# Patient Record
Sex: FEMALE | Race: WHITE | NOT HISPANIC OR LATINO | Employment: OTHER | URBAN - METROPOLITAN AREA
[De-identification: names, ages, dates, MRNs, and addresses within clinical notes are randomized per-mention and may not be internally consistent; named-entity substitution may affect disease eponyms.]

---

## 2023-08-23 ENCOUNTER — LAB (OUTPATIENT)
Dept: LAB | Facility: LAB | Age: 48
End: 2023-08-23
Payer: MEDICARE

## 2023-08-23 LAB
ALBUMIN (G/DL) IN SER/PLAS: 3.7 G/DL (ref 3.4–5)
ANION GAP IN SER/PLAS: 19 MMOL/L (ref 10–20)
CALCIUM (MG/DL) IN SER/PLAS: 9.9 MG/DL (ref 8.6–10.6)
CARBON DIOXIDE, TOTAL (MMOL/L) IN SER/PLAS: 25 MMOL/L (ref 21–32)
CHLORIDE (MMOL/L) IN SER/PLAS: 95 MMOL/L (ref 98–107)
CREATININE (MG/DL) IN SER/PLAS: 6.77 MG/DL (ref 0.5–1.05)
ERYTHROCYTE DISTRIBUTION WIDTH (RATIO) BY AUTOMATED COUNT: 16 % (ref 11.5–14.5)
ERYTHROCYTE MEAN CORPUSCULAR HEMOGLOBIN CONCENTRATION (G/DL) BY AUTOMATED: 30.2 G/DL (ref 32–36)
ERYTHROCYTE MEAN CORPUSCULAR VOLUME (FL) BY AUTOMATED COUNT: 101 FL (ref 80–100)
ERYTHROCYTES (10*6/UL) IN BLOOD BY AUTOMATED COUNT: 3.41 X10E12/L (ref 4–5.2)
GFR FEMALE: 7 ML/MIN/1.73M2
GLUCOSE (MG/DL) IN SER/PLAS: 253 MG/DL (ref 74–99)
HEMATOCRIT (%) IN BLOOD BY AUTOMATED COUNT: 34.4 % (ref 36–46)
HEMOGLOBIN (G/DL) IN BLOOD: 10.4 G/DL (ref 12–16)
INR IN PPP BY COAGULATION ASSAY: 1.8 (ref 0.9–1.1)
LEUKOCYTES (10*3/UL) IN BLOOD BY AUTOMATED COUNT: 13.7 X10E9/L (ref 4.4–11.3)
NRBC (PER 100 WBCS) BY AUTOMATED COUNT: 0 /100 WBC (ref 0–0)
PHOSPHATE (MG/DL) IN SER/PLAS: 5.1 MG/DL (ref 2.5–4.9)
PLATELETS (10*3/UL) IN BLOOD AUTOMATED COUNT: 272 X10E9/L (ref 150–450)
POTASSIUM (MMOL/L) IN SER/PLAS: 4.1 MMOL/L (ref 3.5–5.3)
PROTHROMBIN TIME (PT) IN PPP BY COAGULATION ASSAY: 20.4 SEC (ref 9.8–12.8)
SODIUM (MMOL/L) IN SER/PLAS: 135 MMOL/L (ref 136–145)
UREA NITROGEN (MG/DL) IN SER/PLAS: 70 MG/DL (ref 6–23)

## 2023-10-09 PROBLEM — B35.1 DERMATOPHYTOSIS OF NAIL: Status: ACTIVE | Noted: 2023-05-26

## 2023-10-09 PROBLEM — Z95.5 HISTORY OF CORONARY ARTERY STENT PLACEMENT: Status: ACTIVE | Noted: 2023-10-09

## 2023-10-09 PROBLEM — R60.9 FLUID RETENTION: Status: ACTIVE | Noted: 2023-10-09

## 2023-10-09 PROBLEM — G90.9 AUTONOMIC DYSFUNCTION: Status: ACTIVE | Noted: 2023-10-09

## 2023-10-09 PROBLEM — S98.132A AMPUTATION OF TOE OF LEFT FOOT (CMS-HCC): Status: ACTIVE | Noted: 2023-05-26

## 2023-10-09 PROBLEM — T81.30XA WOUND DEHISCENCE: Status: ACTIVE | Noted: 2023-05-26

## 2023-10-09 PROBLEM — I10 HYPERTENSION: Status: ACTIVE | Noted: 2023-10-09

## 2023-10-09 PROBLEM — I73.9 PERIPHERAL ARTERIAL DISEASE (CMS-HCC): Status: ACTIVE | Noted: 2023-05-26

## 2023-10-09 PROBLEM — T45.515A WARFARIN-INDUCED COAGULOPATHY (MULTI): Status: ACTIVE | Noted: 2023-10-09

## 2023-10-09 PROBLEM — I10 BENIGN ESSENTIAL HTN: Status: ACTIVE | Noted: 2023-10-09

## 2023-10-09 PROBLEM — S90.112A CONTUSION OF LEFT GREAT TOE WITHOUT DAMAGE TO NAIL: Status: ACTIVE | Noted: 2023-05-26

## 2023-10-09 PROBLEM — I63.9 STROKE (MULTI): Status: ACTIVE | Noted: 2023-10-09

## 2023-10-09 PROBLEM — L97.524: Status: ACTIVE | Noted: 2023-05-26

## 2023-10-09 PROBLEM — E78.5 HYPERLIPIDEMIA: Status: ACTIVE | Noted: 2023-10-09

## 2023-10-09 PROBLEM — F32.A DEPRESSION: Status: ACTIVE | Noted: 2023-10-09

## 2023-10-09 PROBLEM — Z99.2 ESRD (END STAGE RENAL DISEASE) ON DIALYSIS (MULTI): Status: ACTIVE | Noted: 2023-05-26

## 2023-10-09 PROBLEM — E11.42 TYPE 2 DIABETES MELLITUS WITH PERIPHERAL NEUROPATHY (MULTI): Status: ACTIVE | Noted: 2023-05-26

## 2023-10-09 PROBLEM — D68.32 WARFARIN-INDUCED COAGULOPATHY (MULTI): Status: ACTIVE | Noted: 2023-10-09

## 2023-10-09 PROBLEM — N18.6 ESRD (END STAGE RENAL DISEASE) ON DIALYSIS (MULTI): Status: ACTIVE | Noted: 2023-05-26

## 2023-10-09 PROBLEM — Z86.73 HISTORY OF STROKE: Status: ACTIVE | Noted: 2023-05-26

## 2023-10-09 RX ORDER — UBIDECARENONE 75 MG
500 CAPSULE ORAL EVERY MORNING
COMMUNITY
End: 2023-10-16 | Stop reason: SDUPTHER

## 2023-10-09 RX ORDER — ACETAMINOPHEN 500 MG
1 TABLET ORAL NIGHTLY
COMMUNITY
Start: 2018-10-30

## 2023-10-09 RX ORDER — CALCIUM ACETATE 667 MG/1
3 TABLET ORAL 3 TIMES DAILY
COMMUNITY

## 2023-10-09 RX ORDER — INSULIN GLARGINE 300 U/ML
INJECTION, SOLUTION SUBCUTANEOUS NIGHTLY
COMMUNITY
End: 2023-10-16 | Stop reason: SDUPTHER

## 2023-10-09 RX ORDER — INSULIN DEGLUDEC 100 U/ML
INJECTION, SOLUTION SUBCUTANEOUS NIGHTLY
COMMUNITY

## 2023-10-09 RX ORDER — OMEPRAZOLE 20 MG/1
CAPSULE, DELAYED RELEASE ORAL
COMMUNITY
Start: 2023-03-08 | End: 2023-10-16 | Stop reason: ALTCHOICE

## 2023-10-09 RX ORDER — ATORVASTATIN CALCIUM 80 MG/1
80 TABLET, FILM COATED ORAL EVERY MORNING
COMMUNITY
End: 2023-10-10 | Stop reason: SDUPTHER

## 2023-10-09 RX ORDER — INSULIN ASPART 100 [IU]/ML
INJECTION, SOLUTION INTRAVENOUS; SUBCUTANEOUS
COMMUNITY
End: 2023-10-16 | Stop reason: SDUPTHER

## 2023-10-09 RX ORDER — AMLODIPINE BESYLATE 10 MG/1
10 TABLET ORAL 2 TIMES DAILY
COMMUNITY
Start: 2023-05-30 | End: 2023-10-16 | Stop reason: ALTCHOICE

## 2023-10-09 RX ORDER — DOXYCYCLINE HYCLATE 100 MG
100 TABLET ORAL 2 TIMES DAILY
COMMUNITY

## 2023-10-09 RX ORDER — HYDRALAZINE HYDROCHLORIDE 10 MG/1
TABLET, FILM COATED ORAL
COMMUNITY
End: 2023-10-16 | Stop reason: SDUPTHER

## 2023-10-09 RX ORDER — ATORVASTATIN CALCIUM 40 MG/1
40 TABLET, FILM COATED ORAL
COMMUNITY
End: 2023-10-16 | Stop reason: SDUPTHER

## 2023-10-09 RX ORDER — PEN NEEDLE, DIABETIC 32GX 5/32"
NEEDLE, DISPOSABLE MISCELLANEOUS 4 TIMES DAILY
COMMUNITY
Start: 2023-08-15

## 2023-10-09 RX ORDER — COLLAGENASE SANTYL 250 [ARB'U]/G
OINTMENT TOPICAL DAILY
COMMUNITY
Start: 2023-08-10

## 2023-10-09 RX ORDER — ASPIRIN 81 MG/1
81 TABLET ORAL DAILY
COMMUNITY

## 2023-10-09 RX ORDER — DOCUSATE SODIUM 100 MG/1
100 CAPSULE, LIQUID FILLED ORAL 2 TIMES DAILY
COMMUNITY
Start: 2015-12-28

## 2023-10-09 RX ORDER — LOSARTAN POTASSIUM 50 MG/1
50 TABLET ORAL EVERY MORNING
COMMUNITY
End: 2023-10-16 | Stop reason: SDUPTHER

## 2023-10-09 RX ORDER — INSULIN GLARGINE 300 U/ML
12 INJECTION, SOLUTION SUBCUTANEOUS EVERY MORNING
COMMUNITY
Start: 2022-09-27 | End: 2023-10-16 | Stop reason: SDUPTHER

## 2023-10-09 RX ORDER — INSULIN ASPART 100 [IU]/ML
INJECTION, SOLUTION INTRAVENOUS; SUBCUTANEOUS
COMMUNITY
Start: 2015-11-25 | End: 2023-10-16 | Stop reason: SDUPTHER

## 2023-10-09 RX ORDER — CARVEDILOL 25 MG/1
1 TABLET ORAL
COMMUNITY
Start: 2016-08-09 | End: 2023-11-09

## 2023-10-09 RX ORDER — LANOLIN ALCOHOL/MO/W.PET/CERES
1 CREAM (GRAM) TOPICAL DAILY
COMMUNITY
Start: 2018-10-30

## 2023-10-09 RX ORDER — AMLODIPINE BESYLATE 5 MG/1
1 TABLET ORAL 2 TIMES DAILY
COMMUNITY
Start: 2018-03-19 | End: 2023-10-16 | Stop reason: ALTCHOICE

## 2023-10-09 RX ORDER — INSULIN ASPART 100 [IU]/ML
INJECTION, SUSPENSION SUBCUTANEOUS
COMMUNITY

## 2023-10-09 RX ORDER — LOSARTAN POTASSIUM 100 MG/1
100 TABLET ORAL DAILY
COMMUNITY

## 2023-10-09 RX ORDER — ACETAMINOPHEN 325 MG/1
2 TABLET ORAL EVERY 6 HOURS PRN
COMMUNITY
Start: 2021-11-02

## 2023-10-09 RX ORDER — ASPIRIN 325 MG
325 TABLET ORAL
COMMUNITY
Start: 2016-07-25 | End: 2023-10-16 | Stop reason: ALTCHOICE

## 2023-10-09 RX ORDER — WARFARIN SODIUM 5 MG/1
TABLET ORAL
COMMUNITY
Start: 2018-10-30

## 2023-10-09 RX ORDER — ACETAMINOPHEN, DIPHENHYDRAMINE HCL, PHENYLEPHRINE HCL 325; 25; 5 MG/1; MG/1; MG/1
TABLET ORAL
COMMUNITY
End: 2023-10-16 | Stop reason: SDUPTHER

## 2023-10-09 RX ORDER — AMLODIPINE BESYLATE 2.5 MG/1
2.5 TABLET ORAL 2 TIMES DAILY
COMMUNITY
End: 2023-10-16 | Stop reason: ALTCHOICE

## 2023-10-09 RX ORDER — FUROSEMIDE 20 MG/1
5 TABLET ORAL 2 TIMES DAILY
COMMUNITY

## 2023-10-09 RX ORDER — HYDRALAZINE HYDROCHLORIDE 25 MG/1
25 TABLET, FILM COATED ORAL 2 TIMES DAILY
COMMUNITY
Start: 2023-05-06

## 2023-10-09 RX ORDER — MINOXIDIL 2.5 MG/1
1 TABLET ORAL DAILY
COMMUNITY

## 2023-10-10 ENCOUNTER — OFFICE VISIT (OUTPATIENT)
Dept: CARDIOLOGY | Facility: CLINIC | Age: 48
End: 2023-10-10
Payer: MEDICARE

## 2023-10-10 VITALS
HEIGHT: 61 IN | DIASTOLIC BLOOD PRESSURE: 68 MMHG | BODY MASS INDEX: 22.66 KG/M2 | WEIGHT: 120 LBS | SYSTOLIC BLOOD PRESSURE: 122 MMHG | OXYGEN SATURATION: 98 % | HEART RATE: 67 BPM

## 2023-10-10 DIAGNOSIS — Z95.5 HISTORY OF CORONARY ARTERY STENT PLACEMENT: Primary | ICD-10-CM

## 2023-10-10 DIAGNOSIS — I25.10 CORONARY ARTERY DISEASE INVOLVING NATIVE CORONARY ARTERY OF NATIVE HEART WITHOUT ANGINA PECTORIS: Primary | ICD-10-CM

## 2023-10-10 DIAGNOSIS — N18.6 ESRD (END STAGE RENAL DISEASE) (MULTI): ICD-10-CM

## 2023-10-10 DIAGNOSIS — I25.810 CORONARY ARTERY DISEASE INVOLVING CORONARY BYPASS GRAFT OF NATIVE HEART WITHOUT ANGINA PECTORIS: ICD-10-CM

## 2023-10-10 PROCEDURE — 4010F ACE/ARB THERAPY RXD/TAKEN: CPT | Performed by: STUDENT IN AN ORGANIZED HEALTH CARE EDUCATION/TRAINING PROGRAM

## 2023-10-10 PROCEDURE — 3052F HG A1C>EQUAL 8.0%<EQUAL 9.0%: CPT | Performed by: STUDENT IN AN ORGANIZED HEALTH CARE EDUCATION/TRAINING PROGRAM

## 2023-10-10 PROCEDURE — 99214 OFFICE O/P EST MOD 30 MIN: CPT | Performed by: STUDENT IN AN ORGANIZED HEALTH CARE EDUCATION/TRAINING PROGRAM

## 2023-10-10 PROCEDURE — 3078F DIAST BP <80 MM HG: CPT | Performed by: STUDENT IN AN ORGANIZED HEALTH CARE EDUCATION/TRAINING PROGRAM

## 2023-10-10 PROCEDURE — 3074F SYST BP LT 130 MM HG: CPT | Performed by: STUDENT IN AN ORGANIZED HEALTH CARE EDUCATION/TRAINING PROGRAM

## 2023-10-10 PROCEDURE — 3066F NEPHROPATHY DOC TX: CPT | Performed by: STUDENT IN AN ORGANIZED HEALTH CARE EDUCATION/TRAINING PROGRAM

## 2023-10-10 NOTE — PROGRESS NOTES
Referring clinician: No ref. provider found   Date of Visit: 10/10/2023  9:20 AM EDT  Location of visit: [facility] [address]   Type of Visit: Established patient        Chief Complaint  Cardiac risk stratification pre kidney transplantation        Adult Risk ScreeningThere are no spiritual/cultural practices/values/needs that are important to know   Initial Fall Risk Screening:   VISHNU has not fallen in the last 6 months.       Living Will.   Living Will: No living will on file.   Healthcare POA: No healthcare proxy on file.    Tobacco Screening: Has not used tobacco in the past 6 months. Do you feel UNSAFE?   The patient feels safe in the home.     HPI:    48 year old medically disabled  known to have Type 1 DM, ex smoking history stopped 4/2015, multiple CVAs- l maintained on warfarin, HTN, HL,s/p COVID infection 12/2022, CAD s/p JEANCARLOS to the pLAD 7/13/2015 treated with Ticagrelor given Clopidogrel allergy. With LVEF drop to 45% in 2018 she underwent repeat LHC 10/2018 showed mild to moderate non-obstructive CAD which did not require intervention.she has ESRD on HD at [facility] with MWF schedule. Ms. Gomez has had cardiovascular testing. She had an echocardiogram done at [facility] 9/2021. Images are not available for LVEF is measured at 60-65%, normal right ventricular systolic function with trivial valvular disease and trivial pericardial effusion. She has also had a nuclear pharmacological stress test 9/2021 which details mild to moderate ischemia in LAD/LCx territory but on the left heart catheterization 11/2021 which demonstrated trivial nonobstructive coronary artery disease only.  She has completed cardiac MRI (stress protocol) 8/2022 which demonstrates preserved biventricular function with LVEF 63%. There was subendocardial ischemia in the mid/apical lateral wall.  On TTE 8/2022, LVEF 65-70% with no RWMA and RVSP 32 mmHg.    She underwent cardiovascular testing in  2023 which demonstrated abnormal ECG which prompted a left heart cath which demonstrated multivessel coronary artery disease.  She is status post CABG (8/28/2023 with Dr. Mohan (n situ SETH-LAD  and SVG Y of SETH to Cx ).  In the postoperative.  She required thoracocentesis.    She tells me she has been doing well since hospital discharge and is less fatigued with exertion.  Moreover, she denies chest pain, dyspnea at rest, exertional dyspnea, palpitations, orthopnea, PND, syncope, or presyncope.    She continues to ambulate with a rolling walker and believes she is unlimited with exertion.    She has been tolerating dialysis well, no adverse events.    She continues to have preserved biventricular function on TTE 8/2023.    Family History:  Mother - malignancy  CAD- paternal grandmother  Has 2 healthy adult children     SHX  Ex smoker  Never heavy ETOH drinker and nil since 2014  Never illicit drug use       Ix:  The electronic medical record has been reviewed by me for salient history. All cardiovascular imaging and testing available in the electronic medical record, and [employer] has been reviewed.    ROS: Full 10 pt review of symptoms of negative unless discussed above.     Problems:   Patient Active Problem List   Diagnosis    Amputation of toe of left foot (CMS/HCC)    Arteriosclerosis of coronary artery    Autonomic dysfunction    Bilateral edema of lower extremity    Benign essential HTN    Chronic ulcer of left foot with necrosis of bone (CMS/HCC)    Contusion of left great toe without damage to nail    Depression    Dermatophytosis of nail    Dyspnea on exertion    ESRD (end stage renal disease) on dialysis (CMS/HCC)    History of coronary artery stent placement    History of stroke    Hyperlipidemia    Hypertension    Nephrotic syndrome    Peripheral arterial disease (CMS/HCC)    Renal failure    Stroke (CMS/HCC)    Type 2 diabetes mellitus with peripheral neuropathy (CMS/HCC)    Warfarin-induced  "coagulopathy (CMS/HCC)    Wound dehiscence    Fluid retention     Medical History:   Past Medical History:   Diagnosis Date    Essential (primary) hypertension 09/29/2022    HTN (hypertension), benign    Personal history of other diseases of the circulatory system 09/29/2022    History of coronary artery disease    Personal history of other diseases of the nervous system and sense organs 09/29/2022    History of retinal hemorrhage    Personal history of other endocrine, nutritional and metabolic disease 09/29/2022    History of diabetes mellitus    Personal history of transient ischemic attack (TIA), and cerebral infarction without residual deficits 09/29/2022    History of stroke    Type 2 diabetes mellitus with diabetic autonomic (poly)neuropathy (CMS/HCC) 09/29/2022    Diabetic autonomic neuropathy     Surgical Hx:   Past Surgical History:   Procedure Laterality Date    CHOLECYSTECTOMY  07/30/2017    Cholecystectomy    CT ANGIO CORONARY ART WITH HEARTFLOW IF SCORE >30%  8/5/2021    CT HEART CORONARY ANGIOGRAM 8/5/2021 [facility]CILLARY LE[facility]    HYSTERECTOMY  09/29/2022    Hysterectomy    OTHER SURGICAL HISTORY  09/29/2022    Creation Of A-V Graft Fistula For Dialysis      Social Hx:   Tobacco Use: Medium Risk (10/10/2023)    Patient History     Smoking Tobacco Use: Former     Smokeless Tobacco Use: Unknown     Passive Exposure: Not on file     Alcohol Use: Not on file     Family Hx:   Family History   Problem Relation Name Age of Onset    Cancer Mother      Diabetes Mother's Brother      Cancer Maternal Grandmother        Exam:   Vitals:   Vitals:    10/10/23 0900   BP: 122/68   Pulse: 67   SpO2: 98%   Weight: 54.4 kg (120 lb)   Height: 1.549 m (5' 1\")     Wt Readings from Last 5 Encounters:   10/10/23 54.4 kg (120 lb)   02/01/23 55.3 kg (122 lb)   09/27/22 54.4 kg (120 lb)   08/04/22 54.9 kg (121 lb)   08/05/21 55.4 kg (122 lb 3.2 oz)     GEN:  very pleasant, well-appearing, no acute distress.  HEENT: JVP not " elevated, no icterus. No HJR  CHEST: Clear to auscultation, No wheeze, good AE bilat  CV: Regular rate, normal rhythm,. PMI not displaced  ABD: , Flat, SNT  EXT: Warm, well perfused, No LE edema bilaterally  NEURO: Pleasant, AO x 4     Labs:   Lab Results   Component Value Date    WBC 11.7 (H) 09/02/2023    WBC 14.3 (H) 09/01/2023    HGB 8.1 (L) 09/02/2023    HGB 8.2 (L) 09/01/2023    HCT 26.4 (L) 09/02/2023    HCT 26.2 (L) 09/01/2023     09/02/2023     09/01/2023     (H) 09/02/2023    MCV 99 09/01/2023     09/02/2023     (L) 09/01/2023    K 4.1 09/02/2023    K 4.3 09/01/2023    CL 97 (L) 09/02/2023    CL 97 (L) 09/01/2023    BUN 18 09/02/2023    BUN 41 (H) 09/01/2023    CREATININE 3.41 (H) 09/02/2023    CREATININE 5.24 (H) 09/01/2023    HGBA1C 8.5 (A) 08/24/2023    HGBA1C 9.0 (A) 08/04/2022       Notable Studies: imaging personally reviewed and summarized by me below  EKG:  No results found for this or any previous visit (from the past 4464 hour(s)).    Echo:  -8/2023 reviewed    Current Outpatient Medications   Medication Instructions    acetaminophen (Tylenol) 325 mg tablet 2 tablets, oral, Every 6 hours PRN    amLODIPine (Norvasc) 5 mg tablet 1 tablet, oral, 2 times daily    amLODIPine (NORVASC) 2.5 mg, oral, 2 times daily, Morning and bedtime<BR>    amLODIPine (NORVASC) 10 mg, oral, 2 times daily    aspirin 325 mg, oral, Daily RT    aspirin 81 mg, oral, Daily    atorvastatin (LIPITOR) 40 mg, oral, Daily RT    atorvastatin (LIPITOR) 80 mg, oral, Every morning    calcium acetate (Phoslo) 667 mg tablet 3 tablets, oral, 3 times daily    carvedilol (Coreg) 25 mg tablet 1 tablet, oral, 2 times daily with meals    cyanocobalamin (Vitamin B-12) 1,000 mcg tablet 1 tablet, oral, Daily    cyanocobalamin (VITAMIN B-12) 500 mcg, oral, Every morning    docusate sodium (COLACE) 100 mg, oral, 2 times daily    doxycycline (VIBRA-TABS) 100 mg, oral, 2 times daily    Droplet Pen Needle 32 gauge x  "5/32\" needle subcutaneous, 4 times daily    furosemide (Lasix) 20 mg tablet 5 tablets, oral, 2 times daily    hydrALAZINE (Apresoline) 10 mg tablet oral, Take by mouth. Only taking on days when she does not have dialysis<BR>    hydrALAZINE (APRESOLINE) 25 mg, oral, 2 times daily, Morning and bedtime<BR>    insulin asp prt-insulin aspart (NovoLOG Mix 70-30) 100 unit/mL (70-30) injection subcutaneous, 2 times daily with meals    insulin aspart (NovoLOG) 100 unit/mL injection Sliding scale while eating<BR>    insulin degludec (Tresiba) 100 unit/mL injection subcutaneous, Nightly    insulin detemir (LEVEMIR FLEXTOUCH) 12 Units, subcutaneous, At noon and 16 units at midnight    insulin glargine (Toujeo Max U-300 SoloStar) 300 unit/mL (3 mL) injection subcutaneous, Nightly    losartan (COZAAR) 50 mg, oral, Every morning    losartan (COZAAR) 100 mg, oral, Daily    melatonin 10 mg tablet oral    melatonin 5 mg tablet 1 tablet, oral, Nightly    minoxidil (Loniten) 2.5 mg tablet 1 tablet, oral, Daily    multivit-minerals/folic acid (CENTRUM ADULTS ORAL) Centrum    multivitamin/iron/folic acid (CENTRUM ORAL) 1 tablet, oral, Daily    NovoLOG FlexPen U-100 Insulin 100 unit/mL (3 mL) pen 1:15 CARB RATIO before meals three times a day PLUS ISS 1:50 STA... (REFER TO PRESCRIPTION NOTES).<BR>    omeprazole (PriLOSEC) 20 mg DR capsule     SantyL 250 unit/gram ointment Topical, Daily, Apply to right hallux diabetic ulcer once daily<BR>    SODIUM BICARBONATE ORAL 1 tablet, oral, 2 times daily    ticagrelor (BRILINTA) 60 mg, oral, 2 times daily    Toujeo SoloStar U-300 Insulin 12 Units, Every morning    warfarin (Coumadin) 5 mg tablet oral, As directed     Problems:   # Cardiac risk start pre kidney transplantation    Ms Tatum has been well surgically revascularized and continues to have robust biventricular systolic function. She will be enrolled in cardia rehabilitation- to commence 2 months post CABG and will continue on BB and " statin  therapy indefinitely.    From a cardiac perspective she should be considered low risk for kidney transplantation and I will relay this to her kidney transplant team.    She will continue to be seen annually in cardiology clinic as she waits for kidney transplantation.       Orders:  No orders of the defined types were placed in this encounter.     Followup Appts:  Future Appointments   Date Time Provider Department Center   10/19/2023  2:40 PM Montez Moahn MD [facility]        Kiera Carpio MD PhD

## 2023-10-10 NOTE — PATIENT INSTRUCTIONS
Thank you for coming in today. If you have any questions or concerns, you may call [phone]. You may also contact our heart failure nursing team via email on [email or web address]    Please bring all your pills/medications to your Cardiology appointments.    **  - From a cardiac perspective you are OK to proceed with kidney transplantation    - We will refer you to cardiac rehabilitation. CALL [phone] to schedule your first session    - No new medication changes today    - Please make an appointment to be seen in 1 year

## 2023-10-11 RX ORDER — ATORVASTATIN CALCIUM 80 MG/1
80 TABLET, FILM COATED ORAL EVERY MORNING
Qty: 90 TABLET | Refills: 0 | Status: SHIPPED | OUTPATIENT
Start: 2023-10-11 | End: 2023-10-16 | Stop reason: SDUPTHER

## 2023-10-16 DIAGNOSIS — I25.10 CORONARY ARTERY DISEASE INVOLVING NATIVE CORONARY ARTERY OF NATIVE HEART WITHOUT ANGINA PECTORIS: ICD-10-CM

## 2023-10-16 PROBLEM — Z95.1 S/P CABG X 2: Status: ACTIVE | Noted: 2023-10-16

## 2023-10-16 RX ORDER — LINACLOTIDE 72 UG/1
72 CAPSULE, GELATIN COATED ORAL
COMMUNITY
Start: 2023-09-29

## 2023-10-16 RX ORDER — ATORVASTATIN CALCIUM 80 MG/1
80 TABLET, FILM COATED ORAL EVERY MORNING
Qty: 90 TABLET | Refills: 0 | Status: SHIPPED | OUTPATIENT
Start: 2023-10-16 | End: 2024-01-17

## 2023-10-16 NOTE — PROGRESS NOTES
Kathleen returns status post 8/28/23 off pump cabg x 2. Breanne Mayer RN    Patient returns to clinic about 2 months status post median sternotomy, and coronary artery bypass grafting x2 with in situ right internal thoracic artery to the LAD and reverse saphenous vein graft to the circumflex.  Patient notes some heaviness in her breathing and she went to the emergency room about 2 weeks ago.  She was found to have a large right pleural effusion for which she underwent thoracentesis.  They removed 1 L of fluid.  Patient has felt well since then.  On physical exam her sternum is stable and her incision is healing well there is no erythema or drainage.    We will obtain chest x-ray after clinic to evaluate for recurrence of right pleural effusion.  Patient is doing well and she may resume her normal activities.  Instructed to return to clinic in the future as needed.Patient ID: Kathleen Tatum is a 48 y.o. female.    Procedures

## 2023-10-19 ENCOUNTER — APPOINTMENT (OUTPATIENT)
Dept: CARDIAC SURGERY | Facility: HOSPITAL | Age: 48
End: 2023-10-19
Payer: MEDICARE

## 2023-10-19 ENCOUNTER — HOSPITAL ENCOUNTER (OUTPATIENT)
Dept: RADIOLOGY | Facility: HOSPITAL | Age: 48
Discharge: HOME | End: 2023-10-19
Payer: MEDICARE

## 2023-10-19 ENCOUNTER — OFFICE VISIT (OUTPATIENT)
Dept: CARDIAC SURGERY | Facility: HOSPITAL | Age: 48
End: 2023-10-19
Payer: MEDICARE

## 2023-10-19 VITALS
SYSTOLIC BLOOD PRESSURE: 158 MMHG | DIASTOLIC BLOOD PRESSURE: 94 MMHG | BODY MASS INDEX: 23.43 KG/M2 | OXYGEN SATURATION: 95 % | HEART RATE: 65 BPM | WEIGHT: 124 LBS

## 2023-10-19 DIAGNOSIS — Z95.1 PRESENCE OF AORTOCORONARY BYPASS GRAFT: ICD-10-CM

## 2023-10-19 DIAGNOSIS — Z95.1 S/P CABG X 2: Primary | ICD-10-CM

## 2023-10-19 PROCEDURE — 3080F DIAST BP >= 90 MM HG: CPT | Performed by: THORACIC SURGERY (CARDIOTHORACIC VASCULAR SURGERY)

## 2023-10-19 PROCEDURE — 99024 POSTOP FOLLOW-UP VISIT: CPT | Performed by: THORACIC SURGERY (CARDIOTHORACIC VASCULAR SURGERY)

## 2023-10-19 PROCEDURE — 71046 X-RAY EXAM CHEST 2 VIEWS: CPT | Performed by: RADIOLOGY

## 2023-10-19 PROCEDURE — 3066F NEPHROPATHY DOC TX: CPT | Performed by: THORACIC SURGERY (CARDIOTHORACIC VASCULAR SURGERY)

## 2023-10-19 PROCEDURE — 3077F SYST BP >= 140 MM HG: CPT | Performed by: THORACIC SURGERY (CARDIOTHORACIC VASCULAR SURGERY)

## 2023-10-19 PROCEDURE — 4010F ACE/ARB THERAPY RXD/TAKEN: CPT | Performed by: THORACIC SURGERY (CARDIOTHORACIC VASCULAR SURGERY)

## 2023-10-19 PROCEDURE — 71046 X-RAY EXAM CHEST 2 VIEWS: CPT | Mod: FY

## 2023-10-19 PROCEDURE — 71046 X-RAY EXAM CHEST 2 VIEWS: CPT

## 2023-10-19 PROCEDURE — 3052F HG A1C>EQUAL 8.0%<EQUAL 9.0%: CPT | Performed by: THORACIC SURGERY (CARDIOTHORACIC VASCULAR SURGERY)

## 2023-10-19 ASSESSMENT — LIFESTYLE VARIABLES
HOW MANY STANDARD DRINKS CONTAINING ALCOHOL DO YOU HAVE ON A TYPICAL DAY: PATIENT DOES NOT DRINK
HOW OFTEN DO YOU HAVE A DRINK CONTAINING ALCOHOL: NEVER
SKIP TO QUESTIONS 9-10: 1
AUDIT-C TOTAL SCORE: 0
HOW OFTEN DO YOU HAVE SIX OR MORE DRINKS ON ONE OCCASION: NEVER

## 2023-10-19 ASSESSMENT — PAIN SCALES - GENERAL: PAINLEVEL: 0-NO PAIN

## 2023-11-08 DIAGNOSIS — I25.810 CORONARY ARTERY DISEASE INVOLVING OTHER CORONARY ARTERY BYPASS GRAFT WITHOUT ANGINA PECTORIS: Primary | ICD-10-CM

## 2023-11-09 RX ORDER — CARVEDILOL 25 MG/1
25 TABLET ORAL 2 TIMES DAILY
Qty: 180 TABLET | Refills: 2 | Status: SHIPPED | OUTPATIENT
Start: 2023-11-09

## 2024-01-16 DIAGNOSIS — I25.10 CORONARY ARTERY DISEASE INVOLVING NATIVE CORONARY ARTERY OF NATIVE HEART WITHOUT ANGINA PECTORIS: ICD-10-CM

## 2024-01-17 RX ORDER — ATORVASTATIN CALCIUM 80 MG/1
80 TABLET, FILM COATED ORAL
Qty: 90 TABLET | Refills: 0 | Status: SHIPPED | OUTPATIENT
Start: 2024-01-17

## 2024-02-26 ENCOUNTER — TELEPHONE (OUTPATIENT)
Dept: TRANSPLANT | Facility: HOSPITAL | Age: 49
End: 2024-02-26
Payer: MEDICAID

## 2024-02-26 NOTE — TELEPHONE ENCOUNTER
Called pt to review plan - she is s/p CABG 08/2023.  She recently completed 12 weeks cardiac rehab. I will review her case with Dr. Ortega to see what the next steps are.

## 2024-03-02 ENCOUNTER — DOCUMENTATION (OUTPATIENT)
Dept: TRANSPLANT | Facility: HOSPITAL | Age: 49
End: 2024-03-02
Payer: MEDICAID

## 2024-03-02 NOTE — PROGRESS NOTES
Discussed patient - s/p cabg 08/2023 - with Dr. Ortega re:  plan to get her reactivated.    -Cardiac MRI  -Echo  -Cardiology consult    -CT A/P  -Provider visit  -SW visit  -Updated labs

## 2024-03-05 ENCOUNTER — TELEPHONE (OUTPATIENT)
Dept: TRANSPLANT | Facility: HOSPITAL | Age: 49
End: 2024-03-05
Payer: MEDICAID

## 2024-03-05 DIAGNOSIS — E11.22 TYPE 2 DIABETES MELLITUS WITH DIABETIC CHRONIC KIDNEY DISEASE, UNSPECIFIED CKD STAGE, UNSPECIFIED WHETHER LONG TERM INSULIN USE (MULTI): ICD-10-CM

## 2024-03-05 DIAGNOSIS — Z51.81 ENCOUNTER FOR THERAPEUTIC DRUG LEVEL MONITORING: ICD-10-CM

## 2024-03-05 DIAGNOSIS — Z13.6 ENCOUNTER FOR SCREENING FOR CARDIOVASCULAR DISORDERS: ICD-10-CM

## 2024-03-05 DIAGNOSIS — Z01.818 PRE-TRANSPLANT EVALUATION FOR KIDNEY TRANSPLANT: ICD-10-CM

## 2024-03-05 NOTE — TELEPHONE ENCOUNTER
Spoke to pt, let her know we would be bringing her in for updated testing to work on getting her reactivated s/p cabg Aug 2023.    She is aware of the plan.  Asked to be scheduled on Tuesdays at [facility] for testing and at [facility] for whatever can't be done at [place].    Orders in [facility].  Tasked out to MOY Oakes.

## 2024-03-06 ENCOUNTER — TELEPHONE (OUTPATIENT)
Dept: TRANSPLANT | Facility: HOSPITAL | Age: 49
End: 2024-03-06
Payer: MEDICAID

## 2024-03-07 ENCOUNTER — DOCUMENTATION (OUTPATIENT)
Dept: TRANSPLANT | Facility: HOSPITAL | Age: 49
End: 2024-03-07
Payer: MEDICAID

## 2024-03-07 ENCOUNTER — APPOINTMENT (OUTPATIENT)
Dept: TRANSPLANT | Facility: HOSPITAL | Age: 49
End: 2024-03-07
Payer: MEDICARE

## 2024-03-07 NOTE — PROGRESS NOTES
Per EV Medicare & [place] Medicaid active.  Spoke to patient via the phone today for her A/R all info on file verified & updated.  Patient is aware her Medicare part B, not D, will cover her immune meds.  Patient is aware she has benefits for a LD.  Discussed importance of maintaining her Medicaid benefits.

## 2024-03-11 ENCOUNTER — TELEPHONE (OUTPATIENT)
Dept: CARDIOLOGY | Facility: CLINIC | Age: 49
End: 2024-03-11

## 2024-03-11 DIAGNOSIS — R07.9 CHEST PAIN, UNSPECIFIED TYPE: ICD-10-CM

## 2024-03-26 ENCOUNTER — TELEPHONE (OUTPATIENT)
Dept: TRANSPLANT | Facility: HOSPITAL | Age: 49
End: 2024-03-26

## 2024-03-26 ENCOUNTER — SOCIAL WORK (OUTPATIENT)
Dept: TRANSPLANT | Facility: HOSPITAL | Age: 49
End: 2024-03-26
Payer: MEDICARE

## 2024-03-26 ASSESSMENT — ANXIETY QUESTIONNAIRES
3. WORRYING TOO MUCH ABOUT DIFFERENT THINGS: NOT AT ALL
GAD7 TOTAL SCORE: 0
7. FEELING AFRAID AS IF SOMETHING AWFUL MIGHT HAPPEN: NOT AT ALL
4. TROUBLE RELAXING: NOT AT ALL
2. NOT BEING ABLE TO STOP OR CONTROL WORRYING: NOT AT ALL
1. FEELING NERVOUS, ANXIOUS, OR ON EDGE: NOT AT ALL
5. BEING SO RESTLESS THAT IT IS HARD TO SIT STILL: NOT AT ALL
6. BECOMING EASILY ANNOYED OR IRRITABLE: NOT AT ALL

## 2024-03-26 ASSESSMENT — PATIENT HEALTH QUESTIONNAIRE - PHQ9
5. POOR APPETITE OR OVEREATING: NOT AT ALL
SUM OF ALL RESPONSES TO PHQ9 QUESTIONS 1 & 2: 0
3. TROUBLE FALLING OR STAYING ASLEEP OR SLEEPING TOO MUCH: NOT AT ALL
SUM OF ALL RESPONSES TO PHQ QUESTIONS 1-9: 0
1. LITTLE INTEREST OR PLEASURE IN DOING THINGS: NOT AT ALL
7. TROUBLE CONCENTRATING ON THINGS, SUCH AS READING THE NEWSPAPER OR WATCHING TELEVISION: NOT AT ALL
6. FEELING BAD ABOUT YOURSELF - OR THAT YOU ARE A FAILURE OR HAVE LET YOURSELF OR YOUR FAMILY DOWN: NOT AT ALL
9. THOUGHTS THAT YOU WOULD BE BETTER OFF DEAD, OR OF HURTING YOURSELF: NOT AT ALL
4. FEELING TIRED OR HAVING LITTLE ENERGY: NOT AT ALL
2. FEELING DOWN, DEPRESSED OR HOPELESS: NOT AT ALL
8. MOVING OR SPEAKING SO SLOWLY THAT OTHER PEOPLE COULD HAVE NOTICED. OR THE OPPOSITE, BEING SO FIGETY OR RESTLESS THAT YOU HAVE BEEN MOVING AROUND A LOT MORE THAN USUAL: NOT AT ALL

## 2024-03-26 NOTE — TELEPHONE ENCOUNTER
Spoke to pt today.    Reviewed her upcoming appointments.    I cancelled the stress test per her outside doctor because we need a cardiac MRI stress.    I mailed a list of Kathleen's upcoming appointments to her home address.

## 2024-03-26 NOTE — PROGRESS NOTES
"SW met with Pt and Pt's ex-, Jhonny for psychosocial update. Pt was pleasant and engaged. Pt has Medicare parts A and B and Medicaid for insurance. Pt reported three weeks ago she was hospitalized for having \"heart problems after her bypass.\" Pt shared she has been doing better since this. Pt reported good compliance with her medications, medical appointments, and dialysis. Pt shared she attends dialysis at [facility] on MWF for 3 hours. Pt denied shortening or missing any treatments. Pts primary support is her ex-, Jhonny ([phone]). Pt secondary support is her step-mom, Willy ([phone]). Pt listed her KEITH, Orestes ([phone]) as an alternate support. All supports drive and have working vehicles, live local to Pt, and are able to take time off from work or are retired. Pt described their mood as \"okay.\" Pt denied any recent feelings of depression/anxiety. Pt denied any recent MH diagnosis. Pt denied any current MH treatment. Pt denied any current SI/SA. Pt shared she enjoys going to the [email or web address] and playing games on her Ipad. Pt scored a 0 on both the PHQ-9 and the ISABEL-7, indicating no clinical depression or anxiety. Pt denied any current substance use. SW discussed the inpatient transplant stay and the need for support to be a part of education session. SW discussed the potential need for dialysis after transplant. SW also discussed the frequency of post op appointments - once a week surgeon/nephrologist visits and twice a week labs. Pt expressed understanding. Pt is low psychosocial risk. SW will follow up with Pt annually.     "

## 2024-03-27 ENCOUNTER — DOCUMENTATION (OUTPATIENT)
Dept: TRANSPLANT | Facility: HOSPITAL | Age: 49
End: 2024-03-27
Payer: MEDICAID

## 2024-03-29 ENCOUNTER — TELEPHONE (OUTPATIENT)
Dept: TRANSPLANT | Facility: HOSPITAL | Age: 49
End: 2024-03-29
Payer: MEDICAID

## 2024-03-29 NOTE — TELEPHONE ENCOUNTER
OUOMU for pt.    I cancelled the nuc stress test for next week because she is already scheduled for a cardiac MRI stress for kidney transplant.    I let her know I am mailing a letter with all of her upcoming appointments.    She knows to call with any questions.

## 2024-04-02 ENCOUNTER — APPOINTMENT (OUTPATIENT)
Dept: RADIOLOGY | Facility: CLINIC | Age: 49
End: 2024-04-02
Payer: MEDICARE

## 2024-04-02 ENCOUNTER — APPOINTMENT (OUTPATIENT)
Dept: CARDIOLOGY | Facility: CLINIC | Age: 49
End: 2024-04-02
Payer: MEDICARE

## 2024-04-09 ENCOUNTER — APPOINTMENT (OUTPATIENT)
Dept: CARDIOLOGY | Facility: CLINIC | Age: 49
End: 2024-04-09
Payer: COMMERCIAL

## 2024-04-15 ENCOUNTER — APPOINTMENT (OUTPATIENT)
Dept: CARDIOLOGY | Facility: CLINIC | Age: 49
End: 2024-04-15
Payer: MEDICARE

## 2024-04-16 ENCOUNTER — APPOINTMENT (OUTPATIENT)
Dept: TRANSPLANT | Facility: HOSPITAL | Age: 49
End: 2024-04-16
Payer: MEDICARE

## 2024-04-16 ENCOUNTER — HOSPITAL ENCOUNTER (OUTPATIENT)
Dept: RADIOLOGY | Facility: HOSPITAL | Age: 49
Discharge: HOME | End: 2024-04-16
Payer: MEDICARE

## 2024-04-16 ENCOUNTER — OFFICE VISIT (OUTPATIENT)
Dept: TRANSPLANT | Facility: HOSPITAL | Age: 49
End: 2024-04-16
Payer: MEDICARE

## 2024-04-16 ENCOUNTER — HOSPITAL ENCOUNTER (OUTPATIENT)
Dept: CARDIOLOGY | Facility: HOSPITAL | Age: 49
Discharge: HOME | End: 2024-04-16
Payer: MEDICARE

## 2024-04-16 VITALS
SYSTOLIC BLOOD PRESSURE: 147 MMHG | BODY MASS INDEX: 25.07 KG/M2 | HEART RATE: 65 BPM | OXYGEN SATURATION: 98 % | TEMPERATURE: 96.3 F | DIASTOLIC BLOOD PRESSURE: 67 MMHG | WEIGHT: 131.1 LBS

## 2024-04-16 VITALS — BODY MASS INDEX: 23.31 KG/M2 | HEIGHT: 61 IN | WEIGHT: 123.46 LBS

## 2024-04-16 DIAGNOSIS — Z01.818 PRE-TRANSPLANT EVALUATION FOR KIDNEY TRANSPLANT: Primary | ICD-10-CM

## 2024-04-16 DIAGNOSIS — Z01.818 PRE-TRANSPLANT EVALUATION FOR KIDNEY TRANSPLANT: ICD-10-CM

## 2024-04-16 PROCEDURE — 99215 OFFICE O/P EST HI 40 MIN: CPT | Performed by: TRANSPLANT SURGERY

## 2024-04-16 PROCEDURE — 4010F ACE/ARB THERAPY RXD/TAKEN: CPT | Performed by: TRANSPLANT SURGERY

## 2024-04-16 PROCEDURE — 99215 OFFICE O/P EST HI 40 MIN: CPT | Mod: 25 | Performed by: TRANSPLANT SURGERY

## 2024-04-16 PROCEDURE — 75563 CARD MRI W/STRESS IMG & DYE: CPT

## 2024-04-16 PROCEDURE — 75563 CARD MRI W/STRESS IMG & DYE: CPT | Performed by: RADIOLOGY

## 2024-04-16 PROCEDURE — A9575 INJ GADOTERATE MEGLUMI 0.1ML: HCPCS | Mod: SE | Performed by: STUDENT IN AN ORGANIZED HEALTH CARE EDUCATION/TRAINING PROGRAM

## 2024-04-16 PROCEDURE — 3078F DIAST BP <80 MM HG: CPT | Performed by: TRANSPLANT SURGERY

## 2024-04-16 PROCEDURE — 75565 CARD MRI VELOC FLOW MAPPING: CPT | Performed by: RADIOLOGY

## 2024-04-16 PROCEDURE — 2550000001 HC RX 255 CONTRASTS: Mod: SE | Performed by: STUDENT IN AN ORGANIZED HEALTH CARE EDUCATION/TRAINING PROGRAM

## 2024-04-16 PROCEDURE — 93306 TTE W/DOPPLER COMPLETE: CPT | Performed by: INTERNAL MEDICINE

## 2024-04-16 PROCEDURE — 93306 TTE W/DOPPLER COMPLETE: CPT

## 2024-04-16 PROCEDURE — 74176 CT ABD & PELVIS W/O CONTRAST: CPT

## 2024-04-16 PROCEDURE — 3077F SYST BP >= 140 MM HG: CPT | Performed by: TRANSPLANT SURGERY

## 2024-04-16 RX ORDER — GADOTERATE MEGLUMINE 376.9 MG/ML
22 INJECTION INTRAVENOUS
Status: COMPLETED | OUTPATIENT
Start: 2024-04-16 | End: 2024-04-16

## 2024-04-16 RX ADMIN — GADOTERATE MEGLUMINE 22 ML: 376.9 INJECTION INTRAVENOUS at 10:35

## 2024-04-16 ASSESSMENT — ENCOUNTER SYMPTOMS
ADENOPATHY: 0
ABDOMINAL DISTENTION: 0
EYES NEGATIVE: 1
ABDOMINAL PAIN: 0
DIARRHEA: 0
CONFUSION: 0
COLOR CHANGE: 0
CONSTIPATION: 0
ARTHRALGIAS: 0
COUGH: 0
CHILLS: 0
FREQUENCY: 0
DIZZINESS: 0
SHORTNESS OF BREATH: 0
HALLUCINATIONS: 0
WEAKNESS: 0
LIGHT-HEADEDNESS: 0
FEVER: 0
AGITATION: 0
HEMATURIA: 0
DYSURIA: 0

## 2024-04-16 NOTE — NURSING NOTE
MRI STRESS        Stress protocol: Regadenoson  Regadenoson Dose: 0.4mg  Aminophylline Dose: 100mg     Resting Vitals:  BP: 177/63  HR: 61 bpm  SPO2: 96%  Resting ECG: NSR with T wave abnormality     Stress:  0.4mg IV push Regadenoson:  1 min: /65  HR 63 bpm 96% RA Symptoms: Short of breath  2 min: /65  HR 69 bpm 99% RA Symptoms: Short of breath     Reversal/Recovery:  100mg IV push Aminophylline:  1 min: /60  HR 68 bpm 97% RA Symptoms: Denies  2 min: /58  HR 66 bpm 94% RA Symptoms: Denies  4 min: /56  HR 65 bpm 94% RA Symptoms: Denies  6 min: /57  HR 64 bpm 96% RA Symptoms: Denies     Patients resting HR of 61 bpm lily to a maximum of 69 bpm.  Resting BP of 177/63 was the highest of the exam. Patients post stress EKG remained unchanged.  Patient discharged from the [facility] to home.

## 2024-04-16 NOTE — PROGRESS NOTES
Patient attended appointment on 04/16/2024 with Dr. Glover. Male support present.  Medications and allergies reviewed with the patient. Patient uses a wheelchair for the long distance to the appointment. Patient is tolerating dialysis well. Health screenings reviewed.   Listing consent reviewed.  Recent hospitalizations:  No.  Blood transfusions:  No.  Falls:  No.      Comments:  Pt has echo, CT A/P pending today.  Final cards clearance appt is on 04/25.

## 2024-04-16 NOTE — PROGRESS NOTES
Subjective   Kathleen Tatum is a 48 y.o. female who presents for evaluation while on the kidney transplant waitlist    Patient is a 48-year-old female with multiple past medical problems including type 1 diabetes previous smoker quit in 2015 multiple CVAs.  She also has coronary artery disease requiring PCI and JEANCARLOS.  She underwent a CABG by Dr. Mohan in August 2023.  Following which she did well and completed cardiac rehab.  She is under evaluation for kidney transplantation.  She underwent a recent cardiac MRI with results summarized below.    She had had multiple CVAs in the past with the most recent episode in June 2017.  She does not have any residual deficits in terms of weakness.  But she does have a mild chronic facial droop and mild speech issues.  She can ambulate slowly without walker but has a wide and an unusual gait.    Review of Systems   Constitutional:  Negative for chills and fever.   HENT: Negative.  Negative for congestion.    Eyes: Negative.    Respiratory:  Negative for cough and shortness of breath.    Cardiovascular:  Negative for chest pain.   Gastrointestinal:  Negative for abdominal distention, abdominal pain, constipation and diarrhea.   Endocrine: Negative for cold intolerance and heat intolerance.   Genitourinary:  Negative for dysuria, frequency, hematuria and urgency.   Musculoskeletal:  Negative for arthralgias.   Skin:  Negative for color change.   Allergic/Immunologic: Negative for environmental allergies.   Neurological:  Negative for dizziness, weakness and light-headedness.   Hematological:  Negative for adenopathy.   Psychiatric/Behavioral:  Negative for agitation, confusion and hallucinations.         Objective       8/4/2022     1:22 PM 9/27/2022     9:12 AM 2/1/2023    10:54 AM 10/10/2023     9:00 AM 10/19/2023     3:02 PM 4/16/2024    10:03 AM 4/16/2024    12:21 PM   Vitals   Systolic 152 132 141 122 158  147   Diastolic 64 60 72 68 94  67   Heart Rate 66 72 68 67 65  65  "  Temp 36.7 °C (98 °F)  36.4 °C (97.5 °F)    35.7 °C (96.3 °F)   Resp 18  16       Height (in)  1.524 m (5')  1.549 m (5' 1\")  1.54 m (5' 0.63\")    Weight (lb) 121 120 122 120 124 123.46 131.1   BMI 23.63 kg/m2 23.44 kg/m2 23.83 kg/m2 22.67 kg/m2 23.43 kg/m2 23.61 kg/m2 25.07 kg/m2   BSA (m2) 1.52 m2 1.52 m2 1.53 m2 1.53 m2 1.56 m2 1.55 m2 1.6 m2   Visit Report    Report Report Report Report       Physical Exam  Constitutional:       Appearance: Normal appearance.   HENT:      Head: Normocephalic and atraumatic.      Nose: Nose normal.   Eyes:      Pupils: Pupils are equal, round, and reactive to light.   Cardiovascular:      Rate and Rhythm: Normal rate.   Pulmonary:      Effort: Pulmonary effort is normal. No respiratory distress.   Abdominal:      General: There is no distension.      Palpations: Abdomen is soft. There is no mass.   Musculoskeletal:         General: No swelling. Normal range of motion.      Cervical back: Normal range of motion.   Skin:     General: Skin is warm and dry.   Neurological:      General: No focal deficit present.      Mental Status: She is alert and oriented to person, place, and time.   Psychiatric:         Mood and Affect: Mood normal.         Behavior: Behavior normal.          Lab Review    Blood Type: No results found for: \"ABORH\"  Lab Results   Component Value Date    CREATININE 3.41 (H) 09/02/2023    K 4.1 09/02/2023    GLUCOSE 49 (LL) 09/02/2023    HCT 26.4 (L) 09/02/2023    WBC 11.7 (H) 09/02/2023     09/02/2023    CALCIUM 8.7 09/02/2023     Lab Results   Component Value Date    WBC 11.7 (H) 09/02/2023    HGB 8.1 (L) 09/02/2023    HCT 26.4 (L) 09/02/2023     (H) 09/02/2023     09/02/2023     Lab Results   Component Value Date    GLUCOSE 49 (LL) 09/02/2023    CALCIUM 8.7 09/02/2023     09/02/2023    K 4.1 09/02/2023    CO2 30 09/02/2023    CL 97 (L) 09/02/2023    BUN 18 09/02/2023    CREATININE 3.41 (H) 09/02/2023         MR cardiac " 4/16/2024  IMPRESSION:  1. The left ventricle is normal in size, shape, and has normal global  systolic function. LVEF = 68%. There are no segmental wall motion  abnormalities.  Quantitative values are as noted above.  2. No definite evidence of stress-induced ischemia.  3. Findings consistent with prior subendocardial infarction within  the mid septum with associated rest/stress perfusion defects. The  area of infarction involves less than 25% myocardial thickness.  4. Additional scattered punctate foci of mid myocardial and  subepicardial delayed enhancement as described which are nonspecific.  5. Concentric left ventricular hypertrophy.  6. Findings consistent with mild-to-moderate aortic stenosis.  7. Focal areas of nodular thickening involving the mitral valve  leaflets with restricted motion of the posterior leaflet. Mild mitral  regurgitation is also noted (regurgitant fraction = 15%).  8. Mildly dilated left atrium.        Assessment/Plan    Diagnoses:   1. Pre-transplant evaluation for kidney transplant      Kathleen Tatum is a reasonable candidate for kidney transplantation.    She has had type 1 diabetes since a young age previous smoker quit 10 years ago coronary artery disease with PCI and JEANCARLOS in the coronaries and recently underwent a CABG the August 2023.  She recovered well since surgery and from cardiac rehab.  Her updated cardiac MRI was performed today with reasonable results.  She is due for a Echocardiogram as well as abdomen pelvis CT scan for final clearance.    She has had a previous CVA x 3 with the most recent one being in 2017.  She has mild chronic facial droop but no focal deficits or weaknesses.  She can ambulate slowly without issues but she does have a slow and wide gait since the stroke.    She is on aspirin and Coumadin.

## 2024-04-17 ENCOUNTER — DOCUMENTATION (OUTPATIENT)
Dept: TRANSPLANT | Facility: HOSPITAL | Age: 49
End: 2024-04-17
Payer: MEDICARE

## 2024-04-17 LAB
AORTIC VALVE MEAN GRADIENT: 9 MMHG
AORTIC VALVE PEAK VELOCITY: 2.09 M/S
AV PEAK GRADIENT: 17.5 MMHG
AVA (PEAK VEL): 1.59 CM2
AVA (VTI): 1.65 CM2
EJECTION FRACTION APICAL 4 CHAMBER: 59.4
LEFT ATRIUM VOLUME AREA LENGTH INDEX BSA: 49.4 ML/M2
LEFT VENTRICLE INTERNAL DIMENSION DIASTOLE: 4.5 CM (ref 3.5–6)
LEFT VENTRICULAR OUTFLOW TRACT DIAMETER: 1.9 CM
LV EJECTION FRACTION BIPLANE: 60 %
MITRAL VALVE E/A RATIO: 1.42
RIGHT VENTRICLE FREE WALL PEAK S': 6.16 CM/S
RIGHT VENTRICLE PEAK SYSTOLIC PRESSURE: 35.7 MMHG
TRICUSPID ANNULAR PLANE SYSTOLIC EXCURSION: 1.4 CM

## 2024-04-17 NOTE — PROGRESS NOTES
Per discussion with Dr. Glover and Dr. Ortega, review of the CT A/P does not show any vascular targets.    Will place patient on consent agenda to delist.    Dr. Glover will discuss with the patient.

## 2024-04-23 ENCOUNTER — COMMITTEE REVIEW (OUTPATIENT)
Dept: TRANSPLANT | Facility: HOSPITAL | Age: 49
End: 2024-04-23
Payer: MEDICARE

## 2024-04-23 NOTE — LETTER
April 23, 2024    Kathleen Tatum  [address] [place] [address]      Dear Ms. Tatum:    Our multi-disciplinary transplant team completed a review of your medical records on 4/18/2024.  I regret to inform you that it was our unanimous decision that you are no longer a Kidney transplant candidate and you have been removed from the [facility] ([facility]) transplant waiting list.    Our transplant program consists of surgeons and medical doctors who provide coverage 365 days a year, 24 hours a day.     If you have any questions or concerns regarding your insurance coverage or billing issues, a  is available to speak with you.     It is important to keep us updated of any major changes in your medical condition, contact information and health insurance coverage.     Please don't hesitate to contact us at Dept: [phone] with any questions or concerns. We look forward to working with you through this process.      Sincerely,      Autumn Hudson RN          The [facility] Toll-free Patient Services Line:  Your Resource for Organ Transplant Information    If you have a question regarding your own medical care, you always should call your transplant hospital first. However, for general organ transplant-related information, you should call the [facility] ([facility]) toll-free patient services line at [phone].  Anyone, including potential transplant candidates, candidates, recipients, family members, friends, living donors, and donor family members, can call this number to:    Talk about organ donation, living donation, the transplant process, the donation process, and transplant policies.  Get a free patient information kit with helpful booklets, waiting list and transplant information, and a list of all transplant hospitals.  Ask questions about the Organ Procurement and Transplantation Network (OPTN) web site (http://optn.transplant.hrsa.gov/), the  [facility] Web site (http://unos.org/), or the UNOS web site for living donors and transplant recipients. (http://www.transplantliving.org/).  Learn how OS and the OPTN can help you.  Talk about any concerns that you may have with a transplant hospital.    [employer] is a not-for-profit organization that provides the administrative services for the national OPTN under federal contract to the Health Resources and Services Administration ([facility] an agency under the [place]. Department of Health and Human Services (HHS).    [facility] and the OPTN are responsible for:    Providing educational material for patients, the public, and professionals.  Raising awareness of the need for donated organs and tissue.  Writing organ transplant policy with help from transplant professionals, transplant patients, transplant candidates, donor families, living donors, and the public.  Coordinating organ procurement, matching, and placement.  Collecting information about every organ transplant and donation that occurs in the [place].    Remember, you should contact your transplant hospital directly if you have questions or concerns about your own medical care including medical records, work-up progress, and test results.    [facility] is not your transplant hospital, and staff at [facility] will not be able to transfer you to your transplant hospital, so keep your transplant hospital’s phone number handy.    However, while you research your transplant needs and learn as much as you can about transplantation and donation, we welcome your call to our toll-free patient services line at [phone].      [facility] PIL Final Rev 1-

## 2024-04-23 NOTE — COMMITTEE REVIEW
Patient Discussion Note     Organ being evaluated for: Kidney    Transplant Coordinator: Autumn Hudson  Transplant Surgeon:        Referring Physician:      Committee Review Members:  Finance Martin Hallman   Lab Fabi Mcgee MT   Nutrition COLLETTE VALENZUELA, PAULINA, LD   Pharmacy Janell Ramirez, PharmD    RAJ DEAN, [facility]   Transplant DENYS ALEXIS, CARTER, Autumn Hudson, RN, Candice Lantigua, RN, Alix Parks, RN, Amy Mccray, CARTER, Uriel Gutierrez MD, Chester Jerry MD PhD, Collette Al, CARETR   Transplant Nephrology Daly Brito MD, Doni Mercedes MD   Transplant Surgery Katelynn Brown MD, Rigoberto Glover MD, Tavo Zamudio MD, Shannan Reno MD       Additional Discussion Notes and Findings:  Delist patient due to no vascular targets.

## 2024-04-25 ENCOUNTER — APPOINTMENT (OUTPATIENT)
Dept: CARDIOLOGY | Facility: HOSPITAL | Age: 49
End: 2024-04-25
Payer: MEDICARE

## 2024-04-30 ENCOUNTER — TELEPHONE (OUTPATIENT)
Dept: TRANSPLANT | Facility: HOSPITAL | Age: 49
End: 2024-04-30
Payer: MEDICAID

## 2024-04-30 NOTE — TELEPHONE ENCOUNTER
Talked to Kathleen.    Let her know to have her coordinators at the other transplant centers reach out to me directly once she has started evaluation and I will send records over to them at that point.     No other questions.

## 2024-05-05 ENCOUNTER — DOCUMENTATION (OUTPATIENT)
Dept: TRANSPLANT | Facility: HOSPITAL | Age: 49
End: 2024-05-05
Payer: MEDICAID

## 2024-05-05 NOTE — PROGRESS NOTES
Patient attended appointment on 04/16/2024 with Dr. Glover.  Male support present. Medications and allergies reviewed with the patient. Patient presented in a wheelchair. Patient is tolerating dialysis well. Health screenings reviewed.   Listing consent reviewed.  Recent hospitalizations:  No.  Blood transfusions:  No.  Falls:  No.    Comments:  Pt is here s/p CABG 08/23/2023 to see if we can reactivate her on the transplant list.  Testing is currently UTD.  Dr. Glover to assess pt and review testing including CT A/P.

## 2024-08-20 DIAGNOSIS — I73.9 PERIPHERAL ARTERIAL DISEASE (CMS-HCC): Primary | ICD-10-CM

## 2024-08-20 DIAGNOSIS — I25.810 CORONARY ARTERY DISEASE INVOLVING OTHER CORONARY ARTERY BYPASS GRAFT WITHOUT ANGINA PECTORIS: ICD-10-CM

## 2024-08-20 DIAGNOSIS — I10 BENIGN ESSENTIAL HTN: ICD-10-CM

## 2024-08-21 RX ORDER — CARVEDILOL 25 MG/1
25 TABLET ORAL 2 TIMES DAILY
Qty: 180 TABLET | Refills: 2 | Status: SHIPPED | OUTPATIENT
Start: 2024-08-21

## 2024-11-12 ENCOUNTER — APPOINTMENT (OUTPATIENT)
Dept: CARDIOLOGY | Facility: CLINIC | Age: 49
End: 2024-11-12
Payer: COMMERCIAL

## 2025-05-17 PROCEDURE — 93306 TTE W/DOPPLER COMPLETE: CPT | Performed by: INTERNAL MEDICINE

## 2025-06-18 DIAGNOSIS — I10 BENIGN ESSENTIAL HTN: ICD-10-CM

## 2025-06-18 DIAGNOSIS — I73.9 PERIPHERAL ARTERIAL DISEASE: ICD-10-CM

## 2025-06-18 DIAGNOSIS — I25.810 CORONARY ARTERY DISEASE INVOLVING OTHER CORONARY ARTERY BYPASS GRAFT WITHOUT ANGINA PECTORIS: ICD-10-CM

## 2025-06-18 RX ORDER — CARVEDILOL 25 MG/1
25 TABLET ORAL 2 TIMES DAILY
Qty: 180 TABLET | Refills: 2 | Status: SHIPPED | OUTPATIENT
Start: 2025-06-18